# Patient Record
Sex: MALE
[De-identification: names, ages, dates, MRNs, and addresses within clinical notes are randomized per-mention and may not be internally consistent; named-entity substitution may affect disease eponyms.]

---

## 2017-12-09 ENCOUNTER — HOSPITAL ENCOUNTER (EMERGENCY)
Dept: HOSPITAL 90 - EDH | Age: 42
Discharge: HOME | End: 2017-12-09
Payer: COMMERCIAL

## 2017-12-09 DIAGNOSIS — M77.11: Primary | ICD-10-CM

## 2017-12-09 DIAGNOSIS — I10: ICD-10-CM

## 2017-12-09 DIAGNOSIS — Z79.899: ICD-10-CM

## 2017-12-09 DIAGNOSIS — E78.5: ICD-10-CM

## 2017-12-09 DIAGNOSIS — Z87.891: ICD-10-CM

## 2017-12-09 DIAGNOSIS — Z98.890: ICD-10-CM

## 2017-12-09 LAB
BASOPHILS NFR BLD AUTO: 1.4 % (ref 0–5)
EOSINOPHIL NFR BLD AUTO: 4.7 % (ref 0–8)
ERYTHROCYTE [DISTWIDTH] IN BLOOD BY AUTOMATED COUNT: 16.1 % (ref 11–15.5)
ERYTHROCYTE [SEDIMENTATION RATE] IN BLOOD BY WESTERGREN METHOD: 9 MM/HR (ref 0–15)
HCT VFR BLD AUTO: 45.7 % (ref 42–54)
LYMPHOCYTES NFR SPEC AUTO: 29.9 % (ref 21–51)
MCH RBC QN AUTO: 28.4 PG (ref 27–33)
MCHC RBC AUTO-ENTMCNC: 33.8 G/DL (ref 32–36)
MCV RBC AUTO: 83.9 FL (ref 79–99)
MONOCYTES NFR BLD AUTO: 7.3 % (ref 3–13)
NEUTROPHILS NFR BLD AUTO: 56.7 % (ref 40–77)
NRBC BLD MANUAL-RTO: 0.1 % (ref 0–0.19)
PLATELET # BLD AUTO: 278 K/UL (ref 130–400)
RBC # BLD AUTO: 5.44 MIL/UL (ref 4.5–6.2)
WBC # BLD AUTO: 9.9 K/UL (ref 4.8–10.8)

## 2017-12-09 PROCEDURE — 73080 X-RAY EXAM OF ELBOW: CPT

## 2017-12-09 PROCEDURE — 99285 EMERGENCY DEPT VISIT HI MDM: CPT

## 2017-12-09 PROCEDURE — 96372 THER/PROPH/DIAG INJ SC/IM: CPT

## 2017-12-09 PROCEDURE — 85651 RBC SED RATE NONAUTOMATED: CPT

## 2017-12-09 PROCEDURE — 85025 COMPLETE CBC W/AUTO DIFF WBC: CPT

## 2017-12-09 PROCEDURE — 20551 NJX 1 TENDON ORIGIN/INSJ: CPT

## 2017-12-09 PROCEDURE — 86141 C-REACTIVE PROTEIN HS: CPT

## 2017-12-09 PROCEDURE — 36415 COLL VENOUS BLD VENIPUNCTURE: CPT

## 2018-06-17 ENCOUNTER — HOSPITAL ENCOUNTER (EMERGENCY)
Dept: HOSPITAL 90 - EDH | Age: 43
Discharge: HOME | End: 2018-06-17
Payer: COMMERCIAL

## 2018-06-17 DIAGNOSIS — M54.16: Primary | ICD-10-CM

## 2018-06-17 DIAGNOSIS — E78.5: ICD-10-CM

## 2018-06-17 DIAGNOSIS — Z72.0: ICD-10-CM

## 2018-06-17 DIAGNOSIS — I10: ICD-10-CM

## 2018-06-17 PROCEDURE — 96372 THER/PROPH/DIAG INJ SC/IM: CPT

## 2018-06-17 PROCEDURE — 99284 EMERGENCY DEPT VISIT MOD MDM: CPT

## 2019-04-27 ENCOUNTER — HOSPITAL ENCOUNTER (EMERGENCY)
Dept: HOSPITAL 90 - EDH | Age: 44
LOS: 1 days | Discharge: HOME | End: 2019-04-28
Payer: COMMERCIAL

## 2019-04-27 DIAGNOSIS — Z87.891: ICD-10-CM

## 2019-04-27 DIAGNOSIS — Z90.49: ICD-10-CM

## 2019-04-27 DIAGNOSIS — I10: ICD-10-CM

## 2019-04-27 DIAGNOSIS — E78.5: ICD-10-CM

## 2019-04-27 DIAGNOSIS — R07.9: Primary | ICD-10-CM

## 2019-04-27 PROCEDURE — 83874 ASSAY OF MYOGLOBIN: CPT

## 2019-04-27 PROCEDURE — 80053 COMPREHEN METABOLIC PANEL: CPT

## 2019-04-27 PROCEDURE — 85730 THROMBOPLASTIN TIME PARTIAL: CPT

## 2019-04-27 PROCEDURE — 85610 PROTHROMBIN TIME: CPT

## 2019-04-27 PROCEDURE — 80305 DRUG TEST PRSMV DIR OPT OBS: CPT

## 2019-04-27 PROCEDURE — 93005 ELECTROCARDIOGRAM TRACING: CPT

## 2019-04-27 PROCEDURE — 71045 X-RAY EXAM CHEST 1 VIEW: CPT

## 2019-04-27 PROCEDURE — 85025 COMPLETE CBC W/AUTO DIFF WBC: CPT

## 2019-04-27 PROCEDURE — 84484 ASSAY OF TROPONIN QUANT: CPT

## 2019-04-27 PROCEDURE — 36415 COLL VENOUS BLD VENIPUNCTURE: CPT

## 2019-04-27 PROCEDURE — 82550 ASSAY OF CK (CPK): CPT

## 2019-04-28 LAB
ALBUMIN SERPL-MCNC: 3.3 G/DL (ref 3.5–5)
ALT SERPL-CCNC: 34 U/L (ref 12–78)
AMPHETAMINES UR QL SCN: NEGATIVE
APTT PPP: 26.1 SEC (ref 26.3–35.5)
AST SERPL-CCNC: 16 U/L (ref 10–37)
BARBITURATES UR QL SCN: NEGATIVE
BASOPHILS NFR BLD AUTO: 1 % (ref 0–5)
BENZODIAZ UR QL SCN: NEGATIVE
BILIRUB SERPL-MCNC: 0.3 MG/DL (ref 0.2–1)
BUN SERPL-MCNC: 16 MG/DL (ref 7–18)
BZE UR QL SCN: NEGATIVE
CANNABINOIDS UR QL SCN: NEGATIVE
CHLORIDE SERPL-SCNC: 105 MMOL/L (ref 101–111)
CK SERPL-CCNC: 98 U/L (ref 21–232)
CO2 SERPL-SCNC: 26 MMOL/L (ref 21–32)
CREAT SERPL-MCNC: 1.3 MG/DL (ref 0.5–1.5)
EOSINOPHIL NFR BLD AUTO: 4.4 % (ref 0–8)
ERYTHROCYTE [DISTWIDTH] IN BLOOD BY AUTOMATED COUNT: 13.5 % (ref 11–15.5)
GFR SERPL CREATININE-BSD FRML MDRD: 64 ML/MIN (ref 60–?)
GLUCOSE SERPL-MCNC: 143 MG/DL (ref 70–105)
HCT VFR BLD AUTO: 42.4 % (ref 42–54)
INR PPP: 0.93 (ref 0.85–1.15)
LYMPHOCYTES NFR SPEC AUTO: 34 % (ref 21–51)
MCH RBC QN AUTO: 30 PG (ref 27–33)
MCHC RBC AUTO-ENTMCNC: 34.1 G/DL (ref 32–36)
MCV RBC AUTO: 88.2 FL (ref 79–99)
MONOCYTES NFR BLD AUTO: 6.6 % (ref 3–13)
MYOGLOBIN SERPL-MCNC: 38 NG/ML (ref 10–92)
NEUTROPHILS NFR BLD AUTO: 54 % (ref 40–77)
NRBC BLD MANUAL-RTO: 0 % (ref 0–0.19)
OPIATES UR QL SCN: NEGATIVE
PCP UR QL SCN: NEGATIVE
PLATELET # BLD AUTO: 250 K/UL (ref 130–400)
POTASSIUM SERPL-SCNC: 3.5 MMOL/L (ref 3.5–5.1)
PROT SERPL-MCNC: 6.5 G/DL (ref 6–8.3)
PROTHROMBIN TIME: 9.8 SEC (ref 9.6–11.6)
RBC # BLD AUTO: 4.8 MIL/UL (ref 4.5–6.2)
SODIUM SERPL-SCNC: 140 MMOL/L (ref 136–145)
WBC # BLD AUTO: 9.5 K/UL (ref 4.8–10.8)

## 2025-02-24 ENCOUNTER — HOSPITAL ENCOUNTER (EMERGENCY)
Dept: HOSPITAL 90 - EDH | Age: 50
LOS: 1 days | Discharge: HOME | End: 2025-02-25
Payer: COMMERCIAL

## 2025-02-24 VITALS — HEIGHT: 69 IN | WEIGHT: 259.04 LBS | BODY MASS INDEX: 38.37 KG/M2

## 2025-02-24 VITALS — TEMPERATURE: 97.4 F

## 2025-02-24 DIAGNOSIS — M79.652: ICD-10-CM

## 2025-02-24 DIAGNOSIS — M79.605: Primary | ICD-10-CM

## 2025-02-24 DIAGNOSIS — Z90.49: ICD-10-CM

## 2025-02-24 DIAGNOSIS — R74.8: ICD-10-CM

## 2025-02-24 DIAGNOSIS — I10: ICD-10-CM

## 2025-02-24 LAB
BASOPHILS # BLD AUTO: 0.11 K/UL (ref 0–0.2)
BASOPHILS NFR BLD AUTO: 1 % (ref 0–5)
BUN SERPL-MCNC: 10 MG/DL (ref 7–18)
CHLORIDE SERPL-SCNC: 99 MMOL/L (ref 101–111)
CO2 SERPL-SCNC: 30 MMOL/L (ref 21–32)
CREAT SERPL-MCNC: 1 MG/DL (ref 0.5–1.3)
EOSINOPHIL # BLD AUTO: 0.52 K/UL (ref 0–0.7)
EOSINOPHIL NFR BLD AUTO: 4.7 % (ref 0–8)
ERYTHROCYTE [DISTWIDTH] IN BLOOD BY AUTOMATED COUNT: 13.3 % (ref 11–15.5)
GFR SERPL CREATININE-BSD FRML MDRD: 93 ML/MIN (ref 90–?)
GLUCOSE SERPL-MCNC: 100 MG/DL (ref 70–105)
HCT VFR BLD AUTO: 47.3 % (ref 42–54)
IMM GRANULOCYTES # BLD: 0.07 K/UL (ref 0–1)
LYMPHOCYTES # SPEC AUTO: 2.6 K/UL (ref 1–4.8)
LYMPHOCYTES NFR SPEC AUTO: 23.2 % (ref 21–51)
MCH RBC QN AUTO: 28 PG (ref 27–33)
MCHC RBC AUTO-ENTMCNC: 33.2 G/DL (ref 32–36)
MCV RBC AUTO: 84.5 FL (ref 79–99)
MONOCYTES # BLD AUTO: 0.8 K/UL (ref 0.1–1)
MONOCYTES NFR BLD AUTO: 7.3 % (ref 3–13)
NEUTROPHILS # BLD AUTO: 7 K/UL (ref 1.8–7.7)
NEUTROPHILS NFR BLD AUTO: 63.2 % (ref 40–77)
NRBC BLD MANUAL-RTO: 0 % (ref 0–0.19)
PLATELET # BLD AUTO: 318 K/UL (ref 130–400)
POTASSIUM SERPL-SCNC: 3.8 MMOL/L (ref 3.5–5.1)
RBC # BLD AUTO: 5.6 MIL/UL (ref 4.5–6.2)
SODIUM SERPL-SCNC: 137 MMOL/L (ref 136–145)
WBC # BLD AUTO: 11.1 K/UL (ref 4.8–10.8)

## 2025-02-24 PROCEDURE — 80048 BASIC METABOLIC PNL TOTAL CA: CPT

## 2025-02-24 PROCEDURE — 36415 COLL VENOUS BLD VENIPUNCTURE: CPT

## 2025-02-24 PROCEDURE — 99284 EMERGENCY DEPT VISIT MOD MDM: CPT

## 2025-02-24 PROCEDURE — 93971 EXTREMITY STUDY: CPT

## 2025-02-24 PROCEDURE — 85025 COMPLETE CBC W/AUTO DIFF WBC: CPT

## 2025-02-24 PROCEDURE — 82550 ASSAY OF CK (CPK): CPT

## 2025-02-24 PROCEDURE — 96372 THER/PROPH/DIAG INJ SC/IM: CPT

## 2025-02-24 NOTE — [facility]
US VENOUS DOPPLER UNILATERAL



HISTORY: Swelling



COMPARISON: None



TECHNIQUE: Left lower extremity venous Doppler ultrasound study was

performed.



FINDINGS: The left common femoral, femoral, popliteal, and posterior

tibial veins are visualized.  Normal flow with augmentation and

compressibilities are demonstrated.  Left greater saphenous vein is

patent. 



IMPRESSION: 

1. No evidence of deep venous thrombosis is seen.

## 2025-02-24 NOTE — ERN
ED Note


History of Present Illness


Stated Complaint:  C/O LEFT THIGH PAIN W/LEFT LEG PAIN AND NUMBNESS


Chief Complaint:  Lower Extremity Pain/Injury


Time Seen by MD:  22:32


Time Seen by Midlevel:  22:32


Dictation:


The patient is a 49-year-old male with a history of hypertension on losartan who

presents to the emergency department with complaints of left back of thigh pain,

left leg swelling and numbness onset two days ago.  Patient reports he was seen 

at a urgent care and referred him over to the ER to evaluation of DVT.  Patient 

does report he is a .  Denies any chest pain or shortness of breath.

 Denies any back pain or abdominal pain, denies any urinary or fecal 

incontinence.  Denies any trauma.


Allergies:  


Coded Allergies:  


     No Known Allergies (Unverified  Allergy, Unknown, 2/24/25)





Past Medical History


Past Medical History:  Hypertension


Surgical History:  Cholecystectomy, Other


RN Note Reviewed/Agreed w/PFSH:  Yes





Review of System


Dictation


Constitutional: Negative for fever,chills, and weight loss


Eyes: Negative for injury, pain,redness, and discharge


ENT: Negative for injury,pain or swelling


Cardiovascular: Negative for chest pain, palpitations, and edema


Respiratory: Negative for shortness of breath, cough, and wheezing, 


Abdomen/GI: Negative for abdominal pain, nausea, vomiting, diarrhea, and 

constipation


Back: Negative for injury and pain


: Negative for injury, bleeding and discharge


MS/Extremity: Negative for injury and deformity positive for left leg pain


Skin: Negative for rash, and discoloration


Neuro: Negative for headache, weakness, numbness, tingling, and seizure 


Psych: Negative for suicide ideation, homicidal ideation, and hallucinations





Initial Vital Sign


VS





                                   Vital Signs








  Date Time  Temp Pulse Resp B/P (MAP) Pulse Ox O2 Delivery O2 Flow Rate FiO2


 


2/24/25 22:30 98.1 117 20 161/111 96 Room Air  


 


2/24/25 23:30       0 21











Physical Exam


Dictation


Vital Signs reviewed 


General Appearance: Alert, oriented x 3, no acute distress, well developed, 

nourished. 


Head and Face: non-traumatic.


Eyes: PERRL, pink conjunctivas, eyelid no trauma, anterior chamber with arcus 

senilis. 


Ears: Pinnas intact and no signs of trauma or erythema ear canals clear and no 

discharge TM no erythema 


Nose: No discharge, no bleeding. 


Oropharynx: Mouth normal, tongue pink.


pharynx clear,no erythema, tonsils no exudates, no abscesses noted,  mucous 

membrane moist 


Neck: Supple, non-tender, no thyromegaly, no masses, no JVD, no bruits 


Breast:Deferred 


Chest:No tenderness, no crepitus, no paradoxical movement, no retractions 


Lungs:Clear, well-ventilated, symmetric, no rales, no wheezing, no rhonchi, no 

stridor, good breath sounds bilaterally 


Heart: Regular rate, regular rhythm, no murmur, no gallops 


Vascular: no peripheral edema, 


Abdomen: Soft, positive bowel sounds, nondistended, no guarding, 


nontender, no rebound, no masses no hepatomegaly, no splenomegaly, no Stephens's 

sign, no hernias.


Rectal: Deferred


Genital: Deferred


Neurological: Normal speech,  motor function intact, sensory function intact , 

upper extremities equal and strength, lower extremities equal and strength, no 

facial droop


Musculoskeletal: Neck nontender, full range of motion, back nontender, full 

range of motion,


Extremities: nontender, full range of motion 


Skin: Color pink, dry, no turgor, no rash, no lacerations, no abrasions, no 

contusions.


Lymphatic: Deferred





Results (Laboratory/Radiology)


Laboratory/Radiology





Laboratory Tests








Test


 2/24/25


23:29 2/25/25


01:14


 


White Blood Count


 11.1 K/uL


(4.8-10.8)  H 





 


Red Blood Count


 5.60 MIL/uL


(4.50-6.20) 





 


Hemoglobin


 15.7 g/dL


(14.0-18.0) 





 


Hematocrit


 47.3 % (42-54)


 





 


Mean Corpuscular Volume


 84.5 fL


(79-99) 





 


Mean Corpuscular Hemoglobin


 28.0 pg


(27.0-33.0) 





 


Mean Corpuscular Hemoglobin


Concent 33.2 g/dL


(32.0-36.0) 





 


Red Cell Distribution Width


 13.3 %


(11.0-15.5) 





 


Platelet Count


 318 K/uL


(130-400) 





 


Mean Platelet Volume


 9.1 fL


(7.5-10.5) 





 


Immature Granulocyte % (Auto) 0.6 % (0-1)   


 


Neutrophils (%) (Auto)


 63.2 %


(40.0-77.0) 





 


Lymphocytes (%) (Auto)


 23.2 %


(21.0-51.0) 





 


Monocytes (%) (Auto)


 7.3 %


(3.0-13.0) 





 


Eosinophils (%) (Auto)


 4.7 %


(0.0-8.0) 





 


Basophils (%) (Auto)


 1.0 %


(0.0-5.0) 





 


Neutrophils # (Auto)


 7.0 K/uL


(1.8-7.7) 





 


Lymphocytes # (Auto)


 2.6 K/uL


(1.0-4.8) 





 


Monocytes # (Auto)


 0.8 K/uL


(0.1-1.0) 





 


Eosinophils # (Auto)


 0.52 K/uL


(0.00-0.70) 





 


Basophils # (Auto)


 0.11 K/uL


(0.00-0.20) 





 


Absolute Immature Granulocyte


(auto 0.07 K/uL


(0-1) 





 


Nucleated Red Blood Cells


 0.0 %


(0.0-0.19) 





 


Sodium Level


 137 mmol/L


(136-145) 





 


Potassium Level


 3.8 mmol/L


(3.5-5.1) 





 


Chloride Level


 99 mmol/L


(101-111)  L 





 


Carbon Dioxide Level


 30 mmol/L


(21-32) 





 


Blood Urea Nitrogen


 10 mg/dL


(7-18) 





 


Creatinine


 1.0 mg/dL


(0.5-1.3) 





 


Glomerular Filtration Rate


Calc 93 mL/min


(>90) 





 


Random Glucose


 100 mg/dL


() 





 


Total Calcium


 9.2 mg/dL


(8.5-10.1) 





 


Total Creatine Kinase


 403 U/L


()  #*H 305 U/L


()  #H





REASON: swelling, pain, ro dvt


ORDERING PHYSICIAN: MOHAMUD BARFIELD


PROCEDURE: VENOUS UNI  -  US VENOUS DOPPLER UNILATERAL





US VENOUS DOPPLER UNILATERAL





HISTORY: Swelling





COMPARISON: None





TECHNIQUE: Left lower extremity venous Doppler ultrasound study was


performed.





FINDINGS: The left common femoral, femoral, popliteal, and posterior


tibial veins are visualized.  Normal flow with augmentation and


compressibilities are demonstrated.  Left greater saphenous vein is


patent. 





IMPRESSION: 


1. No evidence of deep venous thrombosis is seen.





Labs Reviewed?:  Yes





ED Course


ED Course





Orders








Procedure Category Date Status





  Time 


 


Us Venous Doppler US 2/24/25 Resulted





Unilateral  22:42 


 


Cbc With Differential LAB 2/24/25 Complete





  22:53 


 


Creatine Kinase, Total LAB 2/24/25 Complete





  22:53 


 


Basic Metabolic Panel LAB 2/24/25 Complete





  22:53 


 


Ketorolac 60mg/2ml PHA 2/25/25 Complete





 (Toradol 60mg/2ml)  00:00 


 


0.9%Nacl 1000ml (Ns PHA 2/25/25 Complete





1000ml)  00:30 


 


Creatine Kinase, Total LAB 2/25/25 Complete





  01:05 








Current Medications








 Medications


  (Trade)  Dose


 Ordered  Sig/Derek


 Route


 PRN Reason  Start Time


 Stop Time Status Last Admin


Dose Admin


 


 Ketorolac


 Tromethamine


  (toRADol 60MG/


 2ML)  60 mg  ONCE  ONCE


 IM


   2/25/25 00:00


 2/25/25 00:01 DC 2/25/25 00:02





 


 Sodium Chloride  1,000 ml @ 


 0 mls/hr  ONCE  ONCE


 IV


   2/25/25 00:30


 2/25/25 00:31 DC 2/25/25 00:24











Vital Signs








  Date Time  Temp Pulse Resp B/P (MAP) Pulse Ox O2 Delivery O2 Flow Rate FiO2


 


2/25/25 01:27  92 14 148/91 98 Room Air* 0 21


 


2/24/25 23:30 97.3 106 16 157/109 97 Room Air* 0 21


 


2/24/25 22:30 98.1 117 20 161/111 96 Room Air  











Medical Decision Making


MDM


The patient is a 49-year-old male with a history of hypertension on losartan who

presents to the emergency department with complaints of left thigh pain, left 

leg swelling and numbness onset two days ago.  Patient reports he was seen at a 

urgent care and referred him over to the ER to evaluation of DVT.  Patient does 

report he is a .  Denies any chest pain or shortness of breath.  

Denies any back pain or abdominal pain, denies any urinary or fecal 

incontinence.  Denies any trauma.


CBC showed mild leukocytosis, no anemia, chemistry showed mild hypochloremia, 

normal renal function, CK of 403.  Received a L of fluids.  CK trending down. 

Ultrasound revealed no evidence of DVT.  Patient neurologically intact.Patient 

instructed to follow up with pcp. 


Differential diagnosis:  Rhabdomyolysis, DVT, sciatic nerve pain, muscle strain





Need for hospitalization: Patient does not meet criteria for hospitalization.





There are no social concerns with this patient.





DX & DISP


Disposition:  Discharge


Departure


Impression:  


   Primary Impression:  Left leg pain


   Additional Impressions:  Left thigh pain, Elevated CK


Condition:  Stable





Additional Instructions:  


Please follow up with your PCP in 1-2 days.  Continue oral hydration at home.  

If symptoms worsen please return to ER.


FOLLOW-UP WITH PRIMARY CARE PROVIDER IN 1 TO 2 DAYS.  TAKE MEDICATIONS AS 

DIRECTED HERE IN THE EMERGENCY ROOM.  OKAY TO CONTINUE HOME MEDICATIONS UNLESS 

OTHERWISE DISCUSSED DURING YOUR VISIT IN THE EMERGENCY ROOM TODAY.  RETURN TO 

YOUR NEAREST EMERGENCY ROOM IF SYMPTOMS WORSEN OR IF THERE IS NO IMPROVEMENT.  

CALL 911 IF YOU NEED IMMEDIATE ASSISTANCE.  TAKE TYLENOL OR MOTRIN 

OVER-THE-COUNTER AS NEEDED AND IF NO CONTRAINDICATIONS ARE PRESENT.  INCREASE 

ORAL HYDRATION.  A WOUND CULTURE OR URINE CULTURE WAS ORDERED HERE IN THE 

EMERGENCY ROOM DEPARTMENT PLEASE FOLLOW-UP WITH PRIMARY CARE PROVIDER AND ADVISE

THEM TO GET REPEAT PORTS FROM OUR FACILITY.  IF YOU HAD ANY ACE WRAP/SPLINTS 

THAT WERE APPLIED HERE, PLEASE DO NOT REMOVE THEM UNTIL YOU SEE YOUR PRIMARY 

CARE OR SPECIALTY.


Referrals:  


SENDY GRANDA MD (PCP)


Time of Disposition:  01:51


I have reviewed the case, and I agree with, Diagnosis and Plan











MOHAMUD BARFIELD               Feb 24, 2025 23:22

## 2025-02-25 VITALS
SYSTOLIC BLOOD PRESSURE: 148 MMHG | DIASTOLIC BLOOD PRESSURE: 91 MMHG | RESPIRATION RATE: 14 BRPM | OXYGEN SATURATION: 98 % | HEART RATE: 92 BPM

## 2025-02-25 LAB — CK SERPL-CCNC: 403 U/L (ref 21–232)

## 2025-02-25 RX ADMIN — SODIUM CHLORIDE ONE MLS/HR: 0.9 INJECTION, SOLUTION INTRAVENOUS at 00:24
